# Patient Record
Sex: FEMALE | Race: WHITE | NOT HISPANIC OR LATINO | Employment: FULL TIME | URBAN - NONMETROPOLITAN AREA
[De-identification: names, ages, dates, MRNs, and addresses within clinical notes are randomized per-mention and may not be internally consistent; named-entity substitution may affect disease eponyms.]

---

## 2023-06-04 PROBLEM — R79.89 ABNORMAL CBC MEASUREMENT: Status: ACTIVE | Noted: 2023-06-04

## 2023-06-04 PROBLEM — N92.6 IRREGULAR PERIODS: Status: ACTIVE | Noted: 2023-06-04

## 2023-06-04 RX ORDER — NORETHINDRONE ACETATE AND ETHINYL ESTRADIOL .03; 1.5 MG/1; MG/1
1 TABLET ORAL DAILY
COMMUNITY
Start: 2021-02-15 | End: 2023-06-05

## 2023-06-05 ENCOUNTER — OFFICE VISIT (OUTPATIENT)
Dept: PRIMARY CARE | Facility: CLINIC | Age: 48
End: 2023-06-05
Payer: COMMERCIAL

## 2023-06-05 VITALS
RESPIRATION RATE: 14 BRPM | TEMPERATURE: 98 F | DIASTOLIC BLOOD PRESSURE: 82 MMHG | HEART RATE: 72 BPM | OXYGEN SATURATION: 98 % | HEIGHT: 68 IN | SYSTOLIC BLOOD PRESSURE: 127 MMHG | WEIGHT: 127.9 LBS | BODY MASS INDEX: 19.38 KG/M2

## 2023-06-05 DIAGNOSIS — Z12.11 COLON CANCER SCREENING: ICD-10-CM

## 2023-06-05 DIAGNOSIS — Z00.00 HEALTHCARE MAINTENANCE: Primary | ICD-10-CM

## 2023-06-05 DIAGNOSIS — R71.8 ELEVATED HEMATOCRIT: ICD-10-CM

## 2023-06-05 PROCEDURE — 99396 PREV VISIT EST AGE 40-64: CPT | Performed by: FAMILY MEDICINE

## 2023-06-05 PROCEDURE — 1036F TOBACCO NON-USER: CPT | Performed by: FAMILY MEDICINE

## 2023-06-05 ASSESSMENT — PATIENT HEALTH QUESTIONNAIRE - PHQ9
1. LITTLE INTEREST OR PLEASURE IN DOING THINGS: NOT AT ALL
2. FEELING DOWN, DEPRESSED OR HOPELESS: NOT AT ALL
SUM OF ALL RESPONSES TO PHQ9 QUESTIONS 1 AND 2: 0

## 2023-06-05 NOTE — ASSESSMENT & PLAN NOTE
Vaccines and screenings reviewed.  Questionnaires completed.  Health and wellness topics reviewed.  Diet and exercise recommendations revisited.  Routine blood work reviewed today, done recently at outside facility for insurance purposes.  This did not include a repeat CBC, will order this to be done with her next routine blood work.  Mammogram scheduled for 6/6/2023 per patient.  Tetanus booster recommended, patient to get at pharmacy once she returns from vacation.    The 3 Colon Cancer screening tests were reviewed with patient, FIT Test, Colonoscopy and Cologuard. Risks and benefits of each test were discussed. Patient has elected to undergo colonoscopy, order placed today.

## 2023-06-05 NOTE — PATIENT INSTRUCTIONS
WELLNESS EXAM COMPLETED TODAY  TDAP: 4/2012 - DUE, recommended every 10 years, patient to get at pharmacy (no prescription needed)  SHINGRIX: N/A - Shingles vaccine (Shingrix) is recommended ages 50+.  PNEUMOVAX: N/A - Pneumonia vaccines recommended at age 65.  PAP: none found - managed by GYN - UTD in last 1-2 years per patient  MAMMO: none found - scheduled for 6/6/2023 per patient  CSCOPE: none found   HEP C SCREEN: none found    The 3 Colon Cancer screening tests were reviewed with patient, FIT Test, Colonoscopy and Cologuard. Risks and benefits of each test were discussed. Patient has elected to undergo colonoscopy, order placed today.    SCREENINGS:  -For those at average risk, American Cancer Society recommends Screening for Cervical Cancer (pap smears) are recommended starting at age 21 and continuing through age 65.   -For those at average risk, American Cancer Society now recommends breast cancer screening starting at the age of 40-45.  -For those at average risk, American Cancer Society now recommends screening for colon cancer starting at the age of 45.  -Bone density screening recommended starting at 65.    LIFESTYLE MEASURES:  -make sure you are avoiding refined carbs such as breads, pasta, cereal, candy, soda,  nutrition bars, granola, chips, and sugar sweetened beverages.      -eat 5- 7 servings daily of veggies,  healthy protein such as chicken, fish,  beans, and eggs, and include healthy fats in your diet such as seeds, nuts, olive oil, avocados, and salmon.   -exercise 4 - 6 days per week as you are able, 150 minutes total weekly divided up is recommended.  -Vitamin D is recommended at 1000 - 5000 IU international units daily.   -Calcium is recommended at 2156-8889 mg daily.  -64 oz of water is recommended daily.   -Always wear sunscreen when you have sun exposure.  -Dental visits recommended every 6 months.  -Eye exam recommended every 2 years, for those with vision problems every year.

## 2023-06-05 NOTE — PROGRESS NOTES
"Subjective   Patient ID: Isma Mccray is a 48 y.o. female who presents for Annual Exam.    Well Adult Physical   Patient here for a comprehensive physical exam.       TDAP: 4/2012  SHINGRIX: N/A  PNEUMOVAX: N/A  PAP: none found - managed by GYN - UTD in last 1-2 years per patient  MAMMO: none found - scheduled for 6/6/2023 per patient  CSCOPE: none found   HEP C SCREEN: none found   CACS: none found     Diet/Exercise:  and her boys are all into gym and fitness, they are all helping each other with fitness journey. Patient working on lifting heavier weights.    Alcohol: 4-5 glasses per week  Smoking: never smoker    Dental visits up to date.  Vision screening up to date.    Breast cancer screening: Denies family history.   Denies lumps/bumps, skin changes, nipple retraction, or nipple drainage.  She reports that mammogram is scheduled for tomorrow.    Cervical cancer screening: Managed by GYN    Colon cancer screening: Denies family history.   Denies melena, hematochezia, constipation, diarrhea, bloating, change in bowel habits.    Patient denies any FMHx of cardiac disease.  Denies chest pain, SOB, palpitations, edema, dizziness.     Patient teaches art at high school level, out for summer.  She is doing well overall.  No new concerns or issues.    Review of Systems   All other systems reviewed and are negative.      Objective   /82 (BP Location: Left arm, Patient Position: Sitting, BP Cuff Size: Adult)   Pulse 72   Temp 36.7 °C (98 °F) (Temporal)   Resp 14   Ht 1.727 m (5' 8\")   Wt 58 kg (127 lb 14.4 oz)   LMP 05/26/2023   SpO2 98%   BMI 19.45 kg/m²     Physical Exam  Vitals and nursing note reviewed.   Constitutional:       General: She is not in acute distress.     Appearance: Normal appearance. She is not toxic-appearing.   HENT:      Head: Normocephalic and atraumatic.   Eyes:      Extraocular Movements: Extraocular movements intact.      Pupils: Pupils are equal, round, and reactive to " light.   Neck:      Thyroid: No thyromegaly.      Vascular: No hepatojugular reflux or JVD.   Cardiovascular:      Rate and Rhythm: Normal rate and regular rhythm.      Heart sounds: No murmur heard.     No friction rub. No gallop.   Pulmonary:      Effort: Pulmonary effort is normal.      Breath sounds: Normal breath sounds. No wheezing, rhonchi or rales.   Abdominal:      General: Bowel sounds are normal. There is no distension.      Palpations: Abdomen is soft. There is no mass.      Tenderness: There is no abdominal tenderness. There is no guarding.   Musculoskeletal:      Right lower leg: No edema.      Left lower leg: No edema.   Lymphadenopathy:      Cervical: No cervical adenopathy.   Skin:     General: Skin is warm and dry.   Neurological:      General: No focal deficit present.      Mental Status: She is alert and oriented to person, place, and time.      Gait: Gait normal.   Psychiatric:         Mood and Affect: Mood normal.         Behavior: Behavior normal.         Assessment/Plan   Problem List Items Addressed This Visit          Hematologic    Elevated hematocrit     Variable with very mild elevation (HCT in 46/47s) noted on prior blood work.  Since very mild suspect related to dehydration.  Repeat CBC ordered today, patient to have this done with her routine blood work done through 's insurance.            Other    Healthcare maintenance - Primary     Vaccines and screenings reviewed.  Questionnaires completed.  Health and wellness topics reviewed.  Diet and exercise recommendations revisited.  Routine blood work reviewed today, done recently at outside facility for insurance purposes.  This did not include a repeat CBC, will order this to be done with her next routine blood work.  Mammogram scheduled for 6/6/2023 per patient.  Tetanus booster recommended, patient to get at pharmacy once she returns from vacation.    The 3 Colon Cancer screening tests were reviewed with patient, FIT Test,  Colonoscopy and Cologuard. Risks and benefits of each test were discussed. Patient has elected to undergo colonoscopy, order placed today.          Other Visit Diagnoses       Colon cancer screening              Follow-up in 1 year for annual physical.   Call for sooner follow-up if needed.          Scribe Attestation  By signing my name below, Brisa MOLINA Scribe   attest that this documentation has been prepared under the direction and in the presence of Katarzyna Farr DO.

## 2023-06-05 NOTE — ASSESSMENT & PLAN NOTE
Variable with very mild elevation (HCT in 46/47s) noted on prior blood work.  Since very mild suspect related to dehydration.  Repeat CBC ordered today, patient to have this done with her routine blood work done through 's insurance.

## 2024-06-05 ENCOUNTER — APPOINTMENT (OUTPATIENT)
Dept: PRIMARY CARE | Facility: CLINIC | Age: 49
End: 2024-06-05
Payer: COMMERCIAL

## 2024-07-20 ENCOUNTER — APPOINTMENT (OUTPATIENT)
Dept: PRIMARY CARE | Facility: CLINIC | Age: 49
End: 2024-07-20
Payer: COMMERCIAL

## 2024-07-20 VITALS
WEIGHT: 127.5 LBS | RESPIRATION RATE: 14 BRPM | DIASTOLIC BLOOD PRESSURE: 85 MMHG | HEIGHT: 67 IN | HEART RATE: 66 BPM | BODY MASS INDEX: 20.01 KG/M2 | SYSTOLIC BLOOD PRESSURE: 128 MMHG | OXYGEN SATURATION: 98 % | TEMPERATURE: 98.7 F

## 2024-07-20 DIAGNOSIS — Z00.00 HEALTHCARE MAINTENANCE: Primary | ICD-10-CM

## 2024-07-20 DIAGNOSIS — Z23 IMMUNIZATION DUE: ICD-10-CM

## 2024-07-20 DIAGNOSIS — F41.9 ANXIETY: ICD-10-CM

## 2024-07-20 DIAGNOSIS — R71.8 ELEVATED HEMATOCRIT: ICD-10-CM

## 2024-07-20 RX ORDER — BUSPIRONE HYDROCHLORIDE 5 MG/1
TABLET ORAL
Qty: 30 TABLET | Refills: 11 | Status: SHIPPED | OUTPATIENT
Start: 2024-07-20

## 2024-07-20 ASSESSMENT — PATIENT HEALTH QUESTIONNAIRE - PHQ9
1. LITTLE INTEREST OR PLEASURE IN DOING THINGS: NOT AT ALL
9. THOUGHTS THAT YOU WOULD BE BETTER OFF DEAD, OR OF HURTING YOURSELF: NOT AT ALL
6. FEELING BAD ABOUT YOURSELF - OR THAT YOU ARE A FAILURE OR HAVE LET YOURSELF OR YOUR FAMILY DOWN: NOT AT ALL
10. IF YOU CHECKED OFF ANY PROBLEMS, HOW DIFFICULT HAVE THESE PROBLEMS MADE IT FOR YOU TO DO YOUR WORK, TAKE CARE OF THINGS AT HOME, OR GET ALONG WITH OTHER PEOPLE: NOT DIFFICULT AT ALL
3. TROUBLE FALLING OR STAYING ASLEEP OR SLEEPING TOO MUCH: NOT AT ALL
SUM OF ALL RESPONSES TO PHQ9 QUESTIONS 1 AND 2: 0
7. TROUBLE CONCENTRATING ON THINGS, SUCH AS READING THE NEWSPAPER OR WATCHING TELEVISION: NOT AT ALL
SUM OF ALL RESPONSES TO PHQ QUESTIONS 1-9: 0
2. FEELING DOWN, DEPRESSED OR HOPELESS: NOT AT ALL
5. POOR APPETITE OR OVEREATING: NOT AT ALL
4. FEELING TIRED OR HAVING LITTLE ENERGY: NOT AT ALL
8. MOVING OR SPEAKING SO SLOWLY THAT OTHER PEOPLE COULD HAVE NOTICED. OR THE OPPOSITE, BEING SO FIGETY OR RESTLESS THAT YOU HAVE BEEN MOVING AROUND A LOT MORE THAN USUAL: NOT AT ALL

## 2024-07-20 ASSESSMENT — ANXIETY QUESTIONNAIRES
GAD7 TOTAL SCORE: 3
IF YOU CHECKED OFF ANY PROBLEMS ON THIS QUESTIONNAIRE, HOW DIFFICULT HAVE THESE PROBLEMS MADE IT FOR YOU TO DO YOUR WORK, TAKE CARE OF THINGS AT HOME, OR GET ALONG WITH OTHER PEOPLE: SOMEWHAT DIFFICULT
1. FEELING NERVOUS, ANXIOUS, OR ON EDGE: SEVERAL DAYS
3. WORRYING TOO MUCH ABOUT DIFFERENT THINGS: SEVERAL DAYS
6. BECOMING EASILY ANNOYED OR IRRITABLE: NOT AT ALL
4. TROUBLE RELAXING: NOT AT ALL
5. BEING SO RESTLESS THAT IT IS HARD TO SIT STILL: NOT AT ALL
2. NOT BEING ABLE TO STOP OR CONTROL WORRYING: SEVERAL DAYS
7. FEELING AFRAID AS IF SOMETHING AWFUL MIGHT HAPPEN: NOT AT ALL

## 2024-07-20 ASSESSMENT — LIFESTYLE VARIABLES: HOW OFTEN DO YOU HAVE A DRINK CONTAINING ALCOHOL: 2-4 TIMES A MONTH

## 2024-07-20 NOTE — ASSESSMENT & PLAN NOTE
"Scales and scores reviewed and discussed with patient, depression negative, anxiety consistent with history of situational anxiety. She wishes to trial as needed medication such as BuSpar, which is reasonable. I have recommended that if not seeing improvement on as needed med would start on daily medication such as Prozac. Patient already doing meditation and yoga on regular basis.    Start BuSpar 5 mg, can take 1-2 tabs up to twice daily as needed.  Fine to take this medication scheduled on daily basis if you find beneficial.  R/B discussion held. Side effects reviewed.  Call for refills if tolerating well.    Continue with mag supplement. See below for list of different types of magnesium and their uses    Recommended counseling (cognitive behavorial therapy), patient disinclined as does feel it would be beneficial for her.    Keep up the great work with meditation, yoga, and mindfulness :)    Consider reading \"Unwinding Anxiety\" by Dami Tenorio.    Recheck mood scales in 3 months. If not seeing improvement as expected    Lifestyle measures as recommended:  Nutrition plays a large role in how we feel, including influencing or mood.   For overall health as well as mental well-being, would  recommend limitation of refined carbohydrates such as pasta, pretzels, crackers, breads, alcohol, sugar sweetened foods or beverages , pastries, cereals, or bagels.   Recommend eating lots of vegetables, lean proteins, some fruits.  Eat small amounts of healthy fat daily, such as a handful of almonds or walnuts, a serving of salmon, a generous splash of olive oil on your salad, an avocado.      Physical activity also positively impacts mood. Anxiety and depression can be significantly improved with consistent exercise. The recommended long-term goal is 4-6 days per week, 30 minutes on those days. Even starting with 5 minutes of brisk walking a couple of times a day a few times a week is a great start.    Consider starting an " exercise routine such as Yoga with Heather , which has free yoga on line through You Tube.  Beeelijah Banda walking videos also offer free exercise sessions on line.  For time efficient but challenging strength and cardio exercises, consider adding in some high intensity interval training (HIIT) sessions such as the free 7 min workout Tabata songs on You Tube .    Journaling, or writing down thoughts, can be therapeutic.   Starting or continuing a gratitude journal to help you focus on positive things in life can make a big difference with depression or anxiety.  Consider writing down 3 things each day for which you are grateful, such as the sound of birds outside, the comfort of a cup of coffee, the company of a pet, etc.   Reflect back on that at the end of each week and relive those moments of gratitude.     Omega-3 fatty acid supplementation may also help mood. Fish oil or krill oil, up  to 1000 mg daily, can positively influence mood in addition to above lifestyle measures.     Meditation can be helpful for many challenges we face such as anxiety, depression, and stress.   Like exercise brings about fitness and well-being for the body, meditation can bring fitness to the mind creating a calmer, more positive mental state.   There are many good resources for more information on meditation, including the web sites  www.pmd-xa-fnnxzeul.org  , www.calm.com,  smart phone applications such as Calm: Meditate and relax with guided mindfulness (free) ,  Meditation Studio by [employer], ($3.99)   William ($4.99), and Head Space (price varies, free intro with options for subscriptions).    It is recommended to remove yourself from technology and bustle every day -   take a 2 minute walk outside at lunch, take a hike for 15 minutes on the way home from work, get up 15 minutes early and find something peaceful and calming on which to focus, such as your breathing, sounds of nature, looking at trees or even a pleasant picture or  houseplant.    Building calm into your day can set the tone, and quiet the undercurrent of thoughts. Daily practice can make a significant positive difference.    Counseling services can also be extremely helpful.  Call your insurance to see what agencies are covered. If covered, a commonly used agency by our patients is [facility] in [place] or [place] ([phone]).     Different types of magnesium and their uses:  For bowels:  To help keep bowels moving (constipation prevention/treatment) magnesium oxide or magnesium citrate (very little magnesium absorbed)     For muscle cramping, sleep, and mood:   I recommend trying the inexpensive option first, this include magnesium chloride or magnesium glycinate 200-500 mg daily (if bowels become too loose back down dose)     If using for sleep, mood, focus and mag chloride or mag glycinate do not seem to be helping:   Can try more expensive ($$$) magnesium  with best CNS (brain) penetration, for sleep, focus, mood  - magnesium L threonate ,or threonine, or Magtein - 2000 mg, between 150 and 450 mg elemental magnesium in that 2000 mg depending on brand. This can be purchased online (amazon has one brand for as little as  $30/mo).

## 2024-07-20 NOTE — PROGRESS NOTES
Subjective   Patient ID: Isma Mccray is a 49 y.o. female who presents for Annual Exam (Pt presents for annual physical exam- ABN was given to pt and signed, pt verbalized understanding. Pt states that she thinks that she may be in perimenopause, would like to discuss anxiety meds as well. ).    HPI     Patient presents today for annual physical.  Patient concerns:  # Labs, brought in records of labs done via [facility].  Last done 9/14/2023 - lipid panel overall favorable, glucose normal    # Anxiety   encouraged her to talk to someone about situational anxiety.    She states she has been freaking out about things now she previously did not. Recently had small cut to her hand then brushed dogs teeth, thoughts about lady that lost her hand to bacterial issues kept ruminating in her mind.    Catastrophizing in past mostly triggered by bad weather.  Now she notices doing it about other issues, such as medical problems.   Otherwise her mood is fine at baseline.  No concerns about depression, no SI, no HI.    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  Inquiring about as needed medication for anxiety.  Currently takes Ashwagandha on PRN basis, mag 300 mg daily.  Also takes vitamin D and Biotin.    Does not feel like counseling would be beneficial for her. Has gotten book to read about dealing with anxiety.    PHQ-9: 0  TAYA-7: 3    # Perimenopause  Irregular periods, LMP may 2024  Infrequent hot flashes, no night sweats  Wonders if due in part to anxiety    WELLNESS VISIT   TDAP: 4/2012 - DUE  SHINGRIX: n/a  PNEUMOVAX: n/a  PAP: 4/2024 neg/neg (managed via GYN, Dr. Smith)  MAMMO: 6/2023  CSCOPE: 1/2024 (normal, no polyps, repeat in 10 years)  DEXA: n/a  HEP C SCREEN: none found  CACS: none found    Patient is agreeable to TDAP vaccine.    Alcohol use: 4 beverages per week  Smoking: never smoker    Dental:  "UTD  Vision: UTD    Cervical cancer screening: utd  Denies family history in first degree relative.  Denies pelvic pain, vaginal discharge, or vaginal bleeding.    Breast cancer screening: due  Denies family history in first degree relative.  Denies lumps/bumps, skin changes, nipple retraction, or nipple drainage.    Bone density screening: n/a  Denies family history in first degree relative.  Patient is/is not supplementing calcium or vitamin D.    Colon cancer screening: utd  Denies family history in first degree relative.  Denies melena, hematochezia, constipation, diarrhea, bloating, change in bowel habits.    Thyroid disorder screening:   Denies family history in first degree relative.  Denies heat or cold intolerance, diarrhea or constipation, coarsening of hair, and palpitations.     Cardiac disorder screening:   Denies family history in first degree relative.  Denies chest pain, SOB, palpitations, edema, dizziness.     ROUTINE VISIT  CHRONIC CONDITIONS:     Elevated Hematocrit  Variable with very mild elevation (HCT in 46/47s) noted on prior blood work.  Since very mild suspect related to dehydration.  Repeat CBC ordered today, patient to have this done with her routine blood work done through 's insurance.    Review of Systems   All other systems reviewed and are negative.      Objective   /85 (BP Location: Left arm, Patient Position: Sitting, BP Cuff Size: Adult)   Pulse 66   Temp 37.1 °C (98.7 °F) (Temporal)   Resp 14   Ht 1.702 m (5' 7\")   Wt 57.8 kg (127 lb 8 oz)   SpO2 98%   BMI 19.97 kg/m²     Physical Exam  Vitals and nursing note reviewed.   Constitutional:       General: She is not in acute distress.     Appearance: Normal appearance. She is not toxic-appearing.   HENT:      Head: Normocephalic and atraumatic.      Mouth/Throat:      Pharynx: Oropharynx is clear.   Eyes:      Pupils: Pupils are equal, round, and reactive to light.   Neck:      Thyroid: No thyromegaly.      " Vascular: No hepatojugular reflux or JVD.   Cardiovascular:      Rate and Rhythm: Normal rate and regular rhythm.      Heart sounds: No murmur heard.     No friction rub. No gallop.   Pulmonary:      Effort: Pulmonary effort is normal.      Breath sounds: Normal breath sounds. No wheezing, rhonchi or rales.   Abdominal:      General: Bowel sounds are normal. There is no distension.      Palpations: Abdomen is soft. There is no mass.      Tenderness: There is no abdominal tenderness. There is no guarding.   Musculoskeletal:      Right lower leg: No edema.      Left lower leg: No edema.   Lymphadenopathy:      Cervical: No cervical adenopathy.   Skin:     General: Skin is warm and dry.   Neurological:      General: No focal deficit present.      Mental Status: She is alert and oriented to person, place, and time.      Gait: Gait normal.   Psychiatric:         Mood and Affect: Mood normal.         Behavior: Behavior normal.         Assessment/Plan   Problem List Items Addressed This Visit             ICD-10-CM    Elevated hematocrit R71.8     Variable with very mild elevation (HCT in 46/47s) noted on prior blood work.  Since very mild suspect related to dehydration.  Repeat CBC ordered today.         Healthcare maintenance - Primary Z00.00     Vaccines and screenings reviewed.  Questionnaires completed.  Health and wellness topics reviewed.  Diet and exercise recommendations revisited.  Routine blood work ordered today.     VACCINES:  -TDAP is DUE, patient agreeable, adminsitered today and good until 2034.  -Shingrix (shingles vaccines) recommended ages 50+  -Prevnar-20 (pneumonia vaccine) recommended at age 65    SCREENINGS:  -Screening pap last completed 4/2024, utd via GYN  -Screening mammogram last completed 7/2024, repeat in 1 year.  -Screening colonoscopy last completed 1/2024, repeat in 10 years.  -Screening DEXA recommended at age 65    LIFESTYLE MEASURES  -consider increasing protein intake provided no issues  with kidneys to 1 gram per 1 pound of ideal body weight per not to exceed 150 gram per day. May have to supplement with a protein powder to achieve this goal.  -make sure you are avoiding refined carbs such as breads, pasta, cereal, candy, soda,  nutrition bars, granola, chips, and sugar sweetened beverages.      -eat 5- 7 servings daily of veggies,  healthy protein such as chicken, fish,  beans, and eggs, and include healthy fats in your diet such as seeds, nuts, olive oil, avocados, and salmon.   -exercise 4 - 6 days per week as you are able, 150 minutes total weekly divided up is recommended with 3-4 of those days including resistance/strength training.  -Vitamin D is recommended at 1000 - 5000 IU international units daily.   -Always wear sunscreen when you have sun exposure.  -64 oz of water is recommended daily.  -Dental visits recommended every 6 months.  -Eye exam recommended every 2 years, for those with vision problems every year.           Relevant Orders    CBC    Comprehensive Metabolic Panel    Anxiety F41.9     Scales and scores reviewed and discussed with patient, depression negative, anxiety consistent with history of situational anxiety. She wishes to trial as needed medication such as BuSpar, which is reasonable. I have recommended that if not seeing improvement on as needed med would start on daily medication such as Prozac. Patient already doing meditation and yoga on regular basis.    Start BuSpar 5 mg, can take 1-2 tabs up to twice daily as needed.  Fine to take this medication scheduled on daily basis if you find beneficial.  R/B discussion held. Side effects reviewed.  Call for refills if tolerating well.    Continue with mag supplement. See below for list of different types of magnesium and their uses    Recommended counseling (cognitive behavorial therapy), patient disinclined as does feel it would be beneficial for her.    Keep up the great work with meditation, yoga, and mindfulness  ":)    Consider reading \"Unwinding Anxiety\" by Dami Tenorio.    Recheck mood scales in 3 months. If not seeing improvement as expected    Lifestyle measures as recommended:  Nutrition plays a large role in how we feel, including influencing or mood.   For overall health as well as mental well-being, would  recommend limitation of refined carbohydrates such as pasta, pretzels, crackers, breads, alcohol, sugar sweetened foods or beverages , pastries, cereals, or bagels.   Recommend eating lots of vegetables, lean proteins, some fruits.  Eat small amounts of healthy fat daily, such as a handful of almonds or walnuts, a serving of salmon, a generous splash of olive oil on your salad, an avocado.      Physical activity also positively impacts mood. Anxiety and depression can be significantly improved with consistent exercise. The recommended long-term goal is 4-6 days per week, 30 minutes on those days. Even starting with 5 minutes of brisk walking a couple of times a day a few times a week is a great start.    Consider starting an exercise routine such as Yoga with Heather , which has free yoga on line through You Tube.  [employer] also offer free exercise sessions on line.  For time efficient but challenging strength and cardio exercises, consider adding in some high intensity interval training (HIIT) sessions such as the free 7 min workout Tabata songs on You Tube .    Journaling, or writing down thoughts, can be therapeutic.   Starting or continuing a gratitude journal to help you focus on positive things in life can make a big difference with depression or anxiety.  Consider writing down 3 things each day for which you are grateful, such as the sound of birds outside, the comfort of a cup of coffee, the company of a pet, etc.   Reflect back on that at the end of each week and relive those moments of gratitude.     Omega-3 fatty acid supplementation may also help mood. Fish oil or krill oil, up  to " 1000 mg daily, can positively influence mood in addition to above lifestyle measures.     Meditation can be helpful for many challenges we face such as anxiety, depression, and stress.   Like exercise brings about fitness and well-being for the body, meditation can bring fitness to the mind creating a calmer, more positive mental state.   There are many good resources for more information on meditation, including the web sites  www.etd-mm-mctrlooz.org  , www.calm.com,  smart phone applications such as Calm: Meditate and relax with guided mindfulness (free) ,  Meditation Studio by Muse, ($3.99)   William ($4.99), and Head Space (price varies, free intro with options for subscriptions).    It is recommended to remove yourself from technology and bustle every day -   take a 2 minute walk outside at lunch, take a hike for 15 minutes on the way home from work, get up 15 minutes early and find something peaceful and calming on which to focus, such as your breathing, sounds of nature, looking at trees or even a pleasant picture or houseplant.    Building calm into your day can set the tone, and quiet the undercurrent of thoughts. Daily practice can make a significant positive difference.    Counseling services can also be extremely helpful.  Call your insurance to see what agencies are covered. If covered, a commonly used agency by our patients is [facility] in [place] or [place] ([phone]).     Different types of magnesium and their uses:  For bowels:  To help keep bowels moving (constipation prevention/treatment) magnesium oxide or magnesium citrate (very little magnesium absorbed)     For muscle cramping, sleep, and mood:   I recommend trying the inexpensive option first, this include magnesium chloride or magnesium glycinate 200-500 mg daily (if bowels become too loose back down dose)     If using for sleep, mood, focus and mag chloride or mag glycinate do not seem to be helping:   Can try  more expensive ($$$) magnesium  with best CNS (brain) penetration, for sleep, focus, mood  - magnesium L threonate ,or threonine, or Magtein - 2000 mg, between 150 and 450 mg elemental magnesium in that 2000 mg depending on brand. This can be purchased online (amazon has one brand for as little as  $30/mo).         Relevant Medications    busPIRone (Buspar) 5 mg tablet     Other Visit Diagnoses         Codes    Immunization due     Z23    Relevant Orders    Tdap vaccine, age 7 years and older  (BOOSTRIX)    Body mass index (BMI) of 19.0-19.9 in adult     Z68.1            Follow-up in 3 months (can be virtual) for recheck mood.  Scales upon rooming.   Call for sooner follow-up if needed.         Scribe Attestation  By signing my name below, I, Brisa Spence, [place]   attest that this documentation has been prepared under the direction and in the presence of Katarzyna Farr DO.

## 2024-07-20 NOTE — ASSESSMENT & PLAN NOTE
Vaccines and screenings reviewed.  Questionnaires completed.  Health and wellness topics reviewed.  Diet and exercise recommendations revisited.  Routine blood work ordered today.     VACCINES:  -TDAP is DUE, patient agreeable, adminsitered today and good until 2034.  -Shingrix (shingles vaccines) recommended ages 50+  -Prevnar-20 (pneumonia vaccine) recommended at age 65    SCREENINGS:  -Screening pap last completed 4/2024, utd via GYN  -Screening mammogram last completed 7/2024, repeat in 1 year.  -Screening colonoscopy last completed 1/2024, repeat in 10 years.  -Screening DEXA recommended at age 65    LIFESTYLE MEASURES  -consider increasing protein intake provided no issues with kidneys to 1 gram per 1 pound of ideal body weight per not to exceed 150 gram per day. May have to supplement with a protein powder to achieve this goal.  -make sure you are avoiding refined carbs such as breads, pasta, cereal, candy, soda,  nutrition bars, granola, chips, and sugar sweetened beverages.      -eat 5- 7 servings daily of veggies,  healthy protein such as chicken, fish,  beans, and eggs, and include healthy fats in your diet such as seeds, nuts, olive oil, avocados, and salmon.   -exercise 4 - 6 days per week as you are able, 150 minutes total weekly divided up is recommended with 3-4 of those days including resistance/strength training.  -Vitamin D is recommended at 1000 - 5000 IU international units daily.   -Always wear sunscreen when you have sun exposure.  -64 oz of water is recommended daily.  -Dental visits recommended every 6 months.  -Eye exam recommended every 2 years, for those with vision problems every year.

## 2024-07-20 NOTE — ASSESSMENT & PLAN NOTE
Variable with very mild elevation (HCT in 46/47s) noted on prior blood work.  Since very mild suspect related to dehydration.  Repeat CBC ordered today.

## 2024-07-20 NOTE — PATIENT INSTRUCTIONS
Healthcare maintenance  Vaccines and screenings reviewed.  Questionnaires completed.  Health and wellness topics reviewed.  Diet and exercise recommendations revisited.  Routine blood work ordered today.     VACCINES:  -TDAP is DUE, patient agreeable, adminsitered today and good until 2034.  -Shingrix (shingles vaccines) recommended ages 50+  -Prevnar-20 (pneumonia vaccine) recommended at age 65    SCREENINGS:  -Screening pap last completed 4/2024, utd via GYN  -Screening mammogram last completed 7/2024, repeat in 1 year.  -Screening colonoscopy last completed 1/2024, repeat in 10 years.  -Screening DEXA recommended at age 65    LIFESTYLE MEASURES  -consider increasing protein intake provided no issues with kidneys to 1 gram per 1 pound of ideal body weight per not to exceed 150 gram per day. May have to supplement with a protein powder to achieve this goal.  -make sure you are avoiding refined carbs such as breads, pasta, cereal, candy, soda,  nutrition bars, granola, chips, and sugar sweetened beverages.      -eat 5- 7 servings daily of veggies,  healthy protein such as chicken, fish,  beans, and eggs, and include healthy fats in your diet such as seeds, nuts, olive oil, avocados, and salmon.   -exercise 4 - 6 days per week as you are able, 150 minutes total weekly divided up is recommended with 3-4 of those days including resistance/strength training.  -Vitamin D is recommended at 1000 - 5000 IU international units daily.   -Always wear sunscreen when you have sun exposure.  -64 oz of water is recommended daily.  -Dental visits recommended every 6 months.  -Eye exam recommended every 2 years, for those with vision problems every year.      Anxiety  Scales and scores reviewed and discussed with patient, depression negative, anxiety consistent with history of situational anxiety. She wishes to trial as needed medication such as BuSpar, which is reasonable. I have recommended that if not seeing improvement on as  "needed med would start on daily medication such as Prozac. Patient already doing meditation and yoga on regular basis.    Start BuSpar 5 mg, can take 1-2 tabs up to twice daily as needed.  Fine to take this medication scheduled on daily basis if you find beneficial.  R/B discussion held. Side effects reviewed.  Call for refills if tolerating well.    Continue with mag supplement. See below for list of different types of magnesium and their uses    Recommended counseling (cognitive behavorial therapy), patient disinclined as does feel it would be beneficial for her.    Keep up the great work with meditation, yoga, and mindfulness :)    Consider reading \"Unwinding Anxiety\" by Dami Tenorio.    Recheck mood scales in 3 months. If not seeing improvement as expected    Lifestyle measures as recommended:  Nutrition plays a large role in how we feel, including influencing or mood.   For overall health as well as mental well-being, would  recommend limitation of refined carbohydrates such as pasta, pretzels, crackers, breads, alcohol, sugar sweetened foods or beverages , pastries, cereals, or bagels.   Recommend eating lots of vegetables, lean proteins, some fruits.  Eat small amounts of healthy fat daily, such as a handful of almonds or walnuts, a serving of salmon, a generous splash of olive oil on your salad, an avocado.      Physical activity also positively impacts mood. Anxiety and depression can be significantly improved with consistent exercise. The recommended long-term goal is 4-6 days per week, 30 minutes on those days. Even starting with 5 minutes of brisk walking a couple of times a day a few times a week is a great start.    Consider starting an exercise routine such as Yoga with Heather , which has free yoga on line through You Tube.  [employer] walking videos also offer free exercise sessions on line.  For time efficient but challenging strength and cardio exercises, consider adding in some high " intensity interval training (HIIT) sessions such as the free 7 min workout Tabata songs on You Tube .    Journaling, or writing down thoughts, can be therapeutic.   Starting or continuing a gratitude journal to help you focus on positive things in life can make a big difference with depression or anxiety.  Consider writing down 3 things each day for which you are grateful, such as the sound of birds outside, the comfort of a cup of coffee, the company of a pet, etc.   Reflect back on that at the end of each week and relive those moments of gratitude.     Omega-3 fatty acid supplementation may also help mood. Fish oil or krill oil, up  to 1000 mg daily, can positively influence mood in addition to above lifestyle measures.     Meditation can be helpful for many challenges we face such as anxiety, depression, and stress.   Like exercise brings about fitness and well-being for the body, meditation can bring fitness to the mind creating a calmer, more positive mental state.   There are many good resources for more information on meditation, including the web sites  www.ivk-vl-onblttvg.org  , www.calm.com,  smart phone applications such as Calm: Meditate and relax with guided mindfulness (free) ,  Meditation Studio by Muse, ($3.99)   William ($4.99), and Head Space (price varies, free intro with options for subscriptions).    It is recommended to remove yourself from technology and bustle every day -   take a 2 minute walk outside at lunch, take a hike for 15 minutes on the way home from work, get up 15 minutes early and find something peaceful and calming on which to focus, such as your breathing, sounds of nature, looking at trees or even a pleasant picture or houseplant.    Building calm into your day can set the tone, and quiet the undercurrent of thoughts. Daily practice can make a significant positive difference.    Counseling services can also be extremely helpful.  Call your insurance to see what agencies are  covered. If covered, a commonly used agency by our patients is [facility] in [place] or [place] ([phone]).     Different types of magnesium and their uses:  For bowels:  To help keep bowels moving (constipation prevention/treatment) magnesium oxide or magnesium citrate (very little magnesium absorbed)     For muscle cramping, sleep, and mood:   I recommend trying the inexpensive option first, this include magnesium chloride or magnesium glycinate 200-500 mg daily (if bowels become too loose back down dose)     If using for sleep, mood, focus and mag chloride or mag glycinate do not seem to be helping:   Can try more expensive ($$$) magnesium  with best CNS (brain) penetration, for sleep, focus, mood  - magnesium L threonate ,or threonine, or Magtein - 2000 mg, between 150 and 450 mg elemental magnesium in that 2000 mg depending on brand. This can be purchased online (amazon has one brand for as little as  $30/mo).    Elevated hematocrit  Variable with very mild elevation (HCT in 46/47s) noted on prior blood work.  Since very mild suspect related to dehydration.  Repeat CBC ordered today.

## 2024-09-10 ENCOUNTER — APPOINTMENT (OUTPATIENT)
Dept: PRIMARY CARE | Facility: CLINIC | Age: 49
End: 2024-09-10
Payer: COMMERCIAL

## 2024-10-28 ENCOUNTER — OFFICE VISIT (OUTPATIENT)
Dept: URGENT CARE | Age: 49
End: 2024-10-28
Payer: COMMERCIAL

## 2024-10-28 VITALS
HEIGHT: 67 IN | OXYGEN SATURATION: 99 % | DIASTOLIC BLOOD PRESSURE: 82 MMHG | SYSTOLIC BLOOD PRESSURE: 131 MMHG | TEMPERATURE: 98.1 F | BODY MASS INDEX: 19.93 KG/M2 | WEIGHT: 127 LBS | HEART RATE: 87 BPM | RESPIRATION RATE: 15 BRPM

## 2024-10-28 DIAGNOSIS — N30.01 ACUTE CYSTITIS WITH HEMATURIA: Primary | ICD-10-CM

## 2024-10-28 DIAGNOSIS — R39.89 URINARY PROBLEM: ICD-10-CM

## 2024-10-28 LAB
POC APPEARANCE, URINE: CLEAR
POC BILIRUBIN, URINE: NEGATIVE
POC BLOOD, URINE: ABNORMAL
POC COLOR, URINE: ABNORMAL
POC GLUCOSE, URINE: NEGATIVE MG/DL
POC KETONES, URINE: NEGATIVE MG/DL
POC LEUKOCYTES, URINE: ABNORMAL
POC NITRITE,URINE: NEGATIVE
POC PH, URINE: 6 PH
POC PROTEIN, URINE: ABNORMAL MG/DL
POC SPECIFIC GRAVITY, URINE: 1.01
POC UROBILINOGEN, URINE: 0.2 EU/DL

## 2024-10-28 PROCEDURE — 87086 URINE CULTURE/COLONY COUNT: CPT

## 2024-10-28 PROCEDURE — 3008F BODY MASS INDEX DOCD: CPT | Performed by: NURSE PRACTITIONER

## 2024-10-28 PROCEDURE — 1036F TOBACCO NON-USER: CPT | Performed by: NURSE PRACTITIONER

## 2024-10-28 PROCEDURE — 81003 URINALYSIS AUTO W/O SCOPE: CPT | Performed by: NURSE PRACTITIONER

## 2024-10-28 PROCEDURE — 99203 OFFICE O/P NEW LOW 30 MIN: CPT | Performed by: NURSE PRACTITIONER

## 2024-10-28 RX ORDER — CEPHALEXIN 500 MG/1
500 CAPSULE ORAL 2 TIMES DAILY
Qty: 14 CAPSULE | Refills: 0 | Status: SHIPPED | OUTPATIENT
Start: 2024-10-28 | End: 2024-11-04

## 2024-10-28 RX ORDER — CEPHALEXIN 500 MG/1
500 CAPSULE ORAL 4 TIMES DAILY
Qty: 14 CAPSULE | Refills: 0 | Status: SHIPPED | OUTPATIENT
Start: 2024-10-28 | End: 2024-10-28

## 2024-10-28 RX ORDER — PHENAZOPYRIDINE HYDROCHLORIDE 200 MG/1
200 TABLET, FILM COATED ORAL 3 TIMES DAILY PRN
Qty: 10 TABLET | Refills: 0 | Status: SHIPPED | OUTPATIENT
Start: 2024-10-28 | End: 2024-10-31

## 2024-10-30 LAB — BACTERIA UR CULT: NORMAL

## 2024-11-25 ENCOUNTER — APPOINTMENT (OUTPATIENT)
Dept: PRIMARY CARE | Facility: CLINIC | Age: 49
End: 2024-11-25
Payer: COMMERCIAL

## 2024-11-27 ENCOUNTER — APPOINTMENT (OUTPATIENT)
Dept: PRIMARY CARE | Facility: CLINIC | Age: 49
End: 2024-11-27
Payer: COMMERCIAL

## 2024-11-27 VITALS
OXYGEN SATURATION: 98 % | DIASTOLIC BLOOD PRESSURE: 76 MMHG | BODY MASS INDEX: 20.44 KG/M2 | SYSTOLIC BLOOD PRESSURE: 117 MMHG | HEART RATE: 75 BPM | TEMPERATURE: 97.5 F | WEIGHT: 130.5 LBS

## 2024-11-27 DIAGNOSIS — R71.8 ELEVATED HEMATOCRIT: ICD-10-CM

## 2024-11-27 DIAGNOSIS — Z00.00 HEALTHCARE MAINTENANCE: Primary | ICD-10-CM

## 2024-11-27 DIAGNOSIS — F41.9 ANXIETY: ICD-10-CM

## 2024-11-27 PROCEDURE — 99214 OFFICE O/P EST MOD 30 MIN: CPT | Performed by: FAMILY MEDICINE

## 2024-11-27 RX ORDER — BUSPIRONE HYDROCHLORIDE 10 MG/1
TABLET ORAL
Qty: 60 TABLET | Refills: 3 | Status: SHIPPED | OUTPATIENT
Start: 2024-11-27

## 2024-11-27 ASSESSMENT — ANXIETY QUESTIONNAIRES
3. WORRYING TOO MUCH ABOUT DIFFERENT THINGS: SEVERAL DAYS
6. BECOMING EASILY ANNOYED OR IRRITABLE: NOT AT ALL
4. TROUBLE RELAXING: NOT AT ALL
1. FEELING NERVOUS, ANXIOUS, OR ON EDGE: SEVERAL DAYS
GAD7 TOTAL SCORE: 2
7. FEELING AFRAID AS IF SOMETHING AWFUL MIGHT HAPPEN: NOT AT ALL
IF YOU CHECKED OFF ANY PROBLEMS ON THIS QUESTIONNAIRE, HOW DIFFICULT HAVE THESE PROBLEMS MADE IT FOR YOU TO DO YOUR WORK, TAKE CARE OF THINGS AT HOME, OR GET ALONG WITH OTHER PEOPLE: SOMEWHAT DIFFICULT
5. BEING SO RESTLESS THAT IT IS HARD TO SIT STILL: NOT AT ALL
2. NOT BEING ABLE TO STOP OR CONTROL WORRYING: NOT AT ALL

## 2024-11-27 NOTE — PATIENT INSTRUCTIONS
Vitamin D3 plus K2 1000 - 5000 inl units     Letting go of anxiety -   Bronson Liu     Creatine     Will do blood work at next practice     No porcine or bovine parts

## 2024-11-27 NOTE — PROGRESS NOTES
-Subjective   Patient ID: Isma Mccray is a 49 y.o. female who presents for Follow-up (Fuv Buspar and states has been fine no complaints, ).    HPI     Anxiety doing ok     Did not really use much of the buspar -  low dose only up to once daily     No side effects     Does get caught up in rumination,  anxious thoughts     Open to meditation     Exercises regularly -  many modalities,   strength, cardio     Is not taking ashwaganda currently       Is vegetarian but does eat dairy, eggs     Working to include protein     Very interested in wellness practice     Has the calm application      Sad with son having girlfriend - feels like she's losing him a bit,   reminds her that this is their job as parents     Knows that's true  just worries about kids     Very family-oriented,    community/ school involved        Good support system       Eats well, exercises regularly       Review of Systems   Respiratory:  Negative for shortness of breath.    Cardiovascular:  Negative for chest pain.   Psychiatric/Behavioral:  Negative for agitation. The patient is nervous/anxious.        Objective   /76 (BP Location: Right arm, Patient Position: Sitting, BP Cuff Size: Adult)   Pulse 75   Temp 36.4 °C (97.5 °F) (Temporal)   Wt 59.2 kg (130 lb 8 oz)   SpO2 98%   BMI 20.44 kg/m²     Physical Exam  Constitutional:       Appearance: Normal appearance. She is normal weight.   Cardiovascular:      Rate and Rhythm: Normal rate and regular rhythm.   Pulmonary:      Effort: Pulmonary effort is normal.      Breath sounds: Normal breath sounds.   Neurological:      Mental Status: She is alert.   Psychiatric:         Mood and Affect: Mood normal.         Behavior: Behavior normal.         Thought Content: Thought content normal.         Judgment: Judgment normal.         Assessment/Plan   Problem List Items Addressed This Visit             ICD-10-CM    Elevated hematocrit R71.8    Healthcare maintenance - Primary Z00.00     Relevant Orders    CBC    Anxiety F41.9    Relevant Medications    busPIRone (Buspar) 10 mg tablet     Vitamin D3 plus K2 1000 - 5000 inl units     Letting go of anxiety -   Bronson Liu     Creatine     Will do blood work at next practice     No porcine or bovine parts     Call for follow up at [facility] with Dr Hager or at [facility] with Dr. Bai

## 2024-12-01 ASSESSMENT — ENCOUNTER SYMPTOMS
NERVOUS/ANXIOUS: 1
AGITATION: 0
SHORTNESS OF BREATH: 0

## 2025-02-01 ENCOUNTER — OFFICE VISIT (OUTPATIENT)
Dept: URGENT CARE | Age: 50
End: 2025-02-01
Payer: COMMERCIAL

## 2025-02-01 VITALS
WEIGHT: 126 LBS | TEMPERATURE: 97.3 F | OXYGEN SATURATION: 100 % | DIASTOLIC BLOOD PRESSURE: 82 MMHG | HEART RATE: 60 BPM | SYSTOLIC BLOOD PRESSURE: 147 MMHG | RESPIRATION RATE: 16 BRPM | BODY MASS INDEX: 19.73 KG/M2

## 2025-02-01 DIAGNOSIS — N30.01 ACUTE CYSTITIS WITH HEMATURIA: Primary | ICD-10-CM

## 2025-02-01 DIAGNOSIS — R35.0 FREQUENCY OF URINATION: ICD-10-CM

## 2025-02-01 LAB
POC APPEARANCE, URINE: CLEAR
POC BILIRUBIN, URINE: NEGATIVE
POC BLOOD, URINE: ABNORMAL
POC COLOR, URINE: YELLOW
POC GLUCOSE, URINE: NEGATIVE MG/DL
POC KETONES, URINE: NEGATIVE MG/DL
POC LEUKOCYTES, URINE: ABNORMAL
POC NITRITE,URINE: NEGATIVE
POC PH, URINE: 6 PH
POC PROTEIN, URINE: NEGATIVE MG/DL
POC SPECIFIC GRAVITY, URINE: 1.01
POC UROBILINOGEN, URINE: 0.2 EU/DL

## 2025-02-01 RX ORDER — NITROFURANTOIN 25; 75 MG/1; MG/1
100 CAPSULE ORAL 2 TIMES DAILY
Qty: 14 CAPSULE | Refills: 0 | Status: SHIPPED | OUTPATIENT
Start: 2025-02-01 | End: 2025-02-08

## 2025-02-01 NOTE — PROGRESS NOTES
Subjective   Patient ID: Isma Mccray is a 49 y.o. female. They present today with a chief complaint of UTI (Uti symptoms today).    History of Present Illness  Patient presents with 1 day history of burning with urination, increased frequency, increased urgency.  Denies fever, chills, body aches, fatigue, nausea, vomiting, back pain.        UTI      Past Medical History  Allergies as of 02/01/2025    (No Known Allergies)       (Not in a hospital admission)       Past Medical History:   Diagnosis Date    Personal history of other endocrine, nutritional and metabolic disease 10/03/2019    History of vitamin D deficiency       History reviewed. No pertinent surgical history.     reports that she has never smoked. She has never been exposed to tobacco smoke. She has never used smokeless tobacco. She reports current alcohol use of about 1.0 standard drink of alcohol per week. She reports that she does not use drugs.    Review of Systems  Review of Systems                               Objective    Vitals:    02/01/25 1833   BP: 147/82   Pulse: 60   Resp: 16   Temp: 36.3 °C (97.3 °F)   SpO2: 100%   Weight: 57.2 kg (126 lb)     No LMP recorded. Patient is perimenopausal.    Physical Exam  Vitals reviewed.   Constitutional:       Appearance: Normal appearance.   Cardiovascular:      Rate and Rhythm: Normal rate and regular rhythm.   Pulmonary:      Effort: Pulmonary effort is normal.      Breath sounds: Normal breath sounds.   Abdominal:      Tenderness: There is no right CVA tenderness or left CVA tenderness.   Skin:     General: Skin is warm and dry.   Neurological:      Mental Status: She is alert.         Procedures    Point of Care Test & Imaging Results from this visit  Results for orders placed or performed in visit on 02/01/25   POCT UA Automated manually resulted   Result Value Ref Range    POC Color, Urine Yellow Straw, Yellow, Light-Yellow    POC Appearance, Urine Clear Clear    POC Glucose, Urine NEGATIVE  NEGATIVE mg/dl    POC Bilirubin, Urine NEGATIVE NEGATIVE    POC Ketones, Urine NEGATIVE NEGATIVE mg/dl    POC Specific Gravity, Urine 1.010 1.005 - 1.035    POC Blood, Urine LARGE (3+) (A) NEGATIVE    POC PH, Urine 6.0 No Reference Range Established PH    POC Protein, Urine NEGATIVE NEGATIVE mg/dl    POC Urobilinogen, Urine 0.2 0.2, 1.0 EU/DL    Poc Nitrite, Urine NEGATIVE NEGATIVE    POC Leukocytes, Urine LARGE (3+) (A) NEGATIVE      No results found.    Diagnostic study results (if any) were reviewed by NANCY Zamudio.    Assessment/Plan   Allergies, medications, history, and pertinent labs/EKGs/Imaging reviewed by NANCY Zamudio. Prescription given for Macrobid and pyridium. Urine sent for culture. Advised patient to follow-up for any increase in symptoms, fever, chills, CVA tenderness. Discussed measures to prevent UTI reoccurrence.       Medical Decision Making  At time of discharge patient was clinically well-appearing and HDS for outpatient management. The patient and/or family was educated regarding diagnosis, supportive care, OTC and Rx medications. The patient and/or family was given the opportunity to ask questions prior to discharge.  They verbalized understanding of my discussion of the plans for treatment, expected course, indications to return to  or seek further evaluation in ED, and the need for timely follow up as directed.   They were provided with a work/school excuse if requested.      Orders and Diagnoses  Diagnoses and all orders for this visit:  Acute cystitis with hematuria  -     nitrofurantoin, macrocrystal-monohydrate, (Macrobid) 100 mg capsule; Take 1 capsule (100 mg) by mouth 2 times a day for 7 days.  -     phenazopyridine 99.5 mg tablet; Take 2 tablets (200 mg) by mouth 3 times a day for 2 days.  -     Urine Culture  Frequency of urination  -     POCT UA Automated manually resulted      Medical Admin Record      Patient disposition: Home    Electronically signed by  Savannah España, APRN-CNP  6:49 PM

## 2025-02-04 ENCOUNTER — TELEPHONE (OUTPATIENT)
Dept: URGENT CARE | Age: 50
End: 2025-02-04

## 2025-02-04 LAB — BACTERIA UR CULT: ABNORMAL

## 2025-02-04 NOTE — TELEPHONE ENCOUNTER
Pt sts her symptoms have improved & she is doing fine. Informed of test results & will follow up if symptoms return.